# Patient Record
(demographics unavailable — no encounter records)

---

## 2025-01-02 NOTE — IMAGING
[AP] : anteroposterior [Mild arthritis (Tonnis Grade 1)] : Mild arthritis (Tonnis Grade 1) [Right] : right knee [All Views] : anteroposterior, lateral, skyline, and anteroposterior standing [There are no fractures, subluxations or dislocations. No significant abnormalities are seen] : There are no fractures, subluxations or dislocations. No significant abnormalities are seen [Components well fixed, in good position] : Components well fixed, in good position

## 2025-01-02 NOTE — HISTORY OF PRESENT ILLNESS
[5] : 5 [1] : 2 [Dull/Aching] : dull/aching [Radiating] : radiating [] : yes [Constant] : constant [Rest] : rest [Meds] : meds [Heat] : heat [Physical therapy] : physical therapy [Walking] : walking [de-identified] : 1/2/2025: Pt here to f/u R hip pain. States symptoms returned approx 11/1/24. Pain mainly in groin and radiates down to R knee and front of thigh. Finished PT for R hip approx 6/2024. States she also didnt receive injection in hip bc of apprehension. Pain associated with weakness and causing buckling of R leg. Slipped on ice and fell 12/26/24 as well.  PMH: R knee Baker's cyst, Partial knee replacement B knees at [facility] 2017, and 2021 8/31/23: Here to f/up back and right hip. Reports right hip pain and pain radiating throughout the right thigh over the anterior and medial aspect.  Attending PT which she does find to be helpful.  5/25/23: here to f/up rigth hip and review MRI results. 5/18/23: Here to f/up on lower back. C/O worsening right groin pain  02/23/23: Here to f/up back and review MRI results. Has consulted with Dr. Romero and is scheduled for L4-5 TFESI next month.  02/16/23: Here to f/up neck, reports improvement with neck pain after MDP at last visit. Reports persistent lower back pain and pain over the left groin radiating over the inner thigh to the knee. Hx of low back pain treated in 2019 with Dr. Marie, MRI with [facility] and did obtain relief after toradol injection.  08/23/22: Pt. is a 56 year old female who present for evaluation of her LT arm. Denies trauma. Symptoms since summer 2022. Reports numbness/tingling in left upper extremity. No formal treatment to date. H/O similar episode years ago. No pain with overhead activity/reaching. Pain does not wake her up at night. Has tried home exercises / stretching.   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  Pt states pain level is the same since last visit. No Improvement . Pt compliant with physical therapy feels like it helps going twice a week.  [FreeTextEntry7] : R foot [de-identified] : PT 2x per week

## 2025-01-02 NOTE — DISCUSSION/SUMMARY
[de-identified] : 58f with right hip djd. Also with hx of partial right knee replacement and complaints of medial right knee pain, would suggest she follow up with her surgeon at [facility].   The patient was advised of the diagnosis. The natural history of the pathology was explained in full to the patient in layman's terms. All questions were answered. The risks and benefits of surgical and non-surgical treatment alternatives were explained in full to the patient.   1) Naproxen rx - gi precautions reviewed  2) note for work: no more than 50miles in a car per day 3) cryotherapy, rest and activity modification  4) discussed the availability of csi for the right hip  5) start physical therapy   Entered by Arianne Keller acting as scribe. Dr. Hollingsworth- The documentation recorded by the scribe accurately reflects the service I personally performed and the decisions made by me.

## 2025-06-23 NOTE — DISCUSSION/SUMMARY
[de-identified] : 58f with right hip djd. Also with hx of partial right knee replacement and complaints of medial right knee pain.  The patient was advised of the diagnosis. The natural history of the pathology was explained in full to the patient in layman's terms. All questions were answered. The risks and benefits of surgical and non-surgical treatment alternatives were explained in full to the patient.   1) c/w PT and HEP  2) note for work: no more than 50miles in a car per day 3) cryotherapy, rest and activity modification  4) consult with joint replacement specialist for further evaluation  5) would anticipate missing occasional 1-2 days of work d/t flare ups of pain.  6) c/w previously prescribed naproxen 500mg, prn  7) rtc prn   Entered by Arianne Keller acting as scribe. Dr. Hollingsworth- The documentation recorded by the scribe accurately reflects the service I personally performed and the decisions made by me.

## 2025-06-23 NOTE — PHYSICAL EXAM
[4___] : flexion 4[unfilled]/5 [Right] : right knee [NL (0)] : extension 0 degrees [] : no calf tenderness [FreeTextEntry9] : contralateral flexion to 125 [TWNoteComboBox7] : flexion 115 degrees

## 2025-06-23 NOTE — HISTORY OF PRESENT ILLNESS
[5] : 5 [1] : 2 [Dull/Aching] : dull/aching [Radiating] : radiating [] : yes [Constant] : constant [Rest] : rest [Meds] : meds [Heat] : heat [Physical therapy] : physical therapy [Walking] : walking [de-identified] : 06/23/2025: Here to f/up for right knee pain. Started PT at [facility] in [place] 2x/week. Pt reports no change in pain. Pt c/o pain with stairs, walking for long periods. Takes Naproxen as prescribed.   1/2/2025: Pt here to f/u R hip pain. States symptoms returned approx 11/1/24. Pain mainly in groin and radiates down to R knee and front of thigh. Finished PT for R hip approx 6/2024. States she also didnt receive injection in hip bc of apprehension. Pain associated with weakness and causing buckling of R leg. Slipped on ice and fell 12/26/24 as well.  PMH: R knee Baker's cyst, Partial knee replacement B knees at [facility] 2017, and 2021 8/31/23: Here to f/up back and right hip. Reports right hip pain and pain radiating throughout the right thigh over the anterior and medial aspect.  Attending PT which she does find to be helpful.  5/25/23: here to f/up rigth hip and review MRI results. 5/18/23: Here to f/up on lower back. C/O worsening right groin pain  02/23/23: Here to f/up back and review MRI results. Has consulted with Dr. Romero and is scheduled for L4-5 TFESI next month.  02/16/23: Here to f/up neck, reports improvement with neck pain after MDP at last visit. Reports persistent lower back pain and pain over the left groin radiating over the inner thigh to the knee. Hx of low back pain treated in 2019 with Dr. Marie, MRI with [facility] and did obtain relief after toradol injection.  08/23/22: Pt. is a 56 year old female who present for evaluation of her LT arm. Denies trauma. Symptoms since summer 2022. Reports numbness/tingling in left upper extremity. No formal treatment to date. H/O similar episode years ago. No pain with overhead activity/reaching. Pain does not wake her up at night. Has tried home exercises / stretching.   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  Pt states pain level is the same since last visit. No Improvement . Pt compliant with physical therapy feels like it helps going twice a week.  [FreeTextEntry7] : R foot [de-identified] : PT 2x per week

## 2025-07-21 NOTE — DISCUSSION/SUMMARY
[de-identified] : 59 year female old with ____. We discussed at length the nature of the patient's condition. We discussed all options and conservative measures, such as ice, NSAIDs, physical therapy, exercise limitations, and injections.        Follow up in     The patient understood and verbally agreed to the treatment plan. All of their questions were answered. The patient should call the office if they have any questions or experience worsening symptoms.

## 2025-07-21 NOTE — PHYSICAL EXAM
[de-identified] : Constitutional: Well nourished , well developed and in no acute distress Psychiatric: Alert and oriented to time place and person.Appropriate affect . Skin: Head, neck, arms and lower extremities:no lesions or discoloration HEENT: Normocephalic, EOM intact, Nasal septum midline, Respiratory: Unlabored respirations,no audible wheezing ,no tachypnea, no cyanosis Cardiovascular: No leg swelling no ankle edema no JVD, pulse regular Vascular: No calf or thigh tenderness, Peripheral pulses: intact Lymphatics: No groin adenopathy,no lymphedema lower or upper extremities     o Right Hip Exam:   Inspection/Palpation : no tenderness, no swelling, no deformity Leg Lengths: equal Passive Range of Motion: flexion   degrees, internal    degrees, external    degrees, abduction    degrees, adduction    degrees Strength: resisted hip flexion 5/5, resisted hip abduction 5/5 Motor Strength: Peroneals, EHL, and tibialis anterior 5/5 Reflexes: Patella 2+ R=L, Achilles 2+ R=L Skin : no erythema, no ecchymosis Sensation : sensation to light touch intact Vascular Exam : no edema, no cyanosis, dorsalis pedis artery pulse _+, posterior tibial artery pulse _+       o Left Hip Exam:   Inspection/Palpation : no tenderness, no swelling, no deformity Leg Lengths: equal Passive Range of Motion: flexion   degrees, internal    degrees, external    degrees, abduction    degrees, adduction    degrees Strength: resisted hip flexion 5/5, resisted hip abduction 5/5 Skin : no erythema, no ecchymosis Sensation : sensation to light touch intact Vascular Exam : no edema, no cyanosis, dorsalis pedis artery pulse _+, posterior tibial artery pulse _+     o Right Knee Exam:   Inspection/Palpation : no tenderness to palpation, no swelling, no deformity Range of Motion : 0 - 120 degrees, no crepitus Stability : no valgus or varus instability present on provocative testing, Lachmans Test (-) Motor Strength: Peroneals, EHL, and tibialis anterior 5/5 Reflexes: Patella 2+ R=L, Achilles 2+ R=L Sensation : sensation to pin intact Vascular Exam : no edema, no cyanosis, dorsalis pedis artery pulse _+, posterior tibial artery pulse _+ Skin : no erythema, no ecchymosis    o Left Knee Exam:   Inspection/Palpation : no tenderness to palpation, no swelling, no deformity Range of Motion : 0 - 120 degrees, no crepitus Stability : no valgus or varus instability present on provocative testing, Lachmans Test (-) Motor Strength: Peroneals, EHL, and tibialis anterior 5/5 Reflexes: Patella 2+ R=L, Achilles 2+ R=L Sensation : sensation to pin intact Vascular Exam : no edema, no cyanosis, dorsalis pedis artery pulse _+, posterior tibial artery pulse _+ Skin : no erythema, no ecchymosis   [de-identified] : 4 views of the ________ knee obtained 07/16/2025  demonstrates lateral compartment degenerative narrowing with a bone on bone opposition, marginal osteophyte formation  AP Pelvis and AP/Lateral views of the ____ hip demonstrate

## 2025-07-21 NOTE — DISCUSSION/SUMMARY
[de-identified] : 59 year female old with ____. We discussed at length the nature of the patient's condition. We discussed all options and conservative measures, such as ice, NSAIDs, physical therapy, exercise limitations, and injections.        Follow up in     The patient understood and verbally agreed to the treatment plan. All of their questions were answered. The patient should call the office if they have any questions or experience worsening symptoms.

## 2025-07-21 NOTE — PHYSICAL EXAM
[de-identified] : Constitutional: Well nourished , well developed and in no acute distress Psychiatric: Alert and oriented to time place and person.Appropriate affect . Skin: Head, neck, arms and lower extremities:no lesions or discoloration HEENT: Normocephalic, EOM intact, Nasal septum midline, Respiratory: Unlabored respirations,no audible wheezing ,no tachypnea, no cyanosis Cardiovascular: No leg swelling no ankle edema no JVD, pulse regular Vascular: No calf or thigh tenderness, Peripheral pulses: intact Lymphatics: No groin adenopathy,no lymphedema lower or upper extremities     o Right Hip Exam:   Inspection/Palpation : no tenderness, no swelling, no deformity Leg Lengths: equal Passive Range of Motion: flexion   degrees, internal    degrees, external    degrees, abduction    degrees, adduction    degrees Strength: resisted hip flexion 5/5, resisted hip abduction 5/5 Motor Strength: Peroneals, EHL, and tibialis anterior 5/5 Reflexes: Patella 2+ R=L, Achilles 2+ R=L Skin : no erythema, no ecchymosis Sensation : sensation to light touch intact Vascular Exam : no edema, no cyanosis, dorsalis pedis artery pulse _+, posterior tibial artery pulse _+       o Left Hip Exam:   Inspection/Palpation : no tenderness, no swelling, no deformity Leg Lengths: equal Passive Range of Motion: flexion   degrees, internal    degrees, external    degrees, abduction    degrees, adduction    degrees Strength: resisted hip flexion 5/5, resisted hip abduction 5/5 Skin : no erythema, no ecchymosis Sensation : sensation to light touch intact Vascular Exam : no edema, no cyanosis, dorsalis pedis artery pulse _+, posterior tibial artery pulse _+     o Right Knee Exam:   Inspection/Palpation : no tenderness to palpation, no swelling, no deformity Range of Motion : 0 - 120 degrees, no crepitus Stability : no valgus or varus instability present on provocative testing, Lachmans Test (-) Motor Strength: Peroneals, EHL, and tibialis anterior 5/5 Reflexes: Patella 2+ R=L, Achilles 2+ R=L Sensation : sensation to pin intact Vascular Exam : no edema, no cyanosis, dorsalis pedis artery pulse _+, posterior tibial artery pulse _+ Skin : no erythema, no ecchymosis    o Left Knee Exam:   Inspection/Palpation : no tenderness to palpation, no swelling, no deformity Range of Motion : 0 - 120 degrees, no crepitus Stability : no valgus or varus instability present on provocative testing, Lachmans Test (-) Motor Strength: Peroneals, EHL, and tibialis anterior 5/5 Reflexes: Patella 2+ R=L, Achilles 2+ R=L Sensation : sensation to pin intact Vascular Exam : no edema, no cyanosis, dorsalis pedis artery pulse _+, posterior tibial artery pulse _+ Skin : no erythema, no ecchymosis   [de-identified] : 4 views of the ________ knee obtained 07/16/2025  demonstrates lateral compartment degenerative narrowing with a bone on bone opposition, marginal osteophyte formation  AP Pelvis and AP/Lateral views of the ____ hip demonstrate

## 2025-07-21 NOTE — HISTORY OF PRESENT ILLNESS
[de-identified] : BARBARA MAX is a 59-year-old female presenting to the office complaining of [] hip and knee pain. Patient presents ambulating independently. Patient reports pain for []. Denies any trauma or injury.  Denies any numbness or tingling of the lower extremities.

## 2025-07-21 NOTE — HISTORY OF PRESENT ILLNESS
[de-identified] : BARBARA MAX is a 59-year-old female presenting to the office complaining of [] hip and knee pain. Patient presents ambulating independently. Patient reports pain for []. Denies any trauma or injury.  Denies any numbness or tingling of the lower extremities.

## 2025-07-21 NOTE — ADDENDUM
[FreeTextEntry1] : This note was written by Jovita Bella on 07/16/2025 acting solely as a scribe for Dr. Donnie Mike.  All medical record entries made by the Scribe were at my, Dr. Donnie Mike, direction and personally dictated by me on 07/16/2025. I have personally reviewed the chart and agree that the record accurately reflects my personal performance of the history, physical exam, assessment and plan.

## 2025-07-22 NOTE — IMAGING
[de-identified] : Constitutional: well developed and well nourished, able to communicate Cardiovascular: Peripheral vascular exam is grossly normal Neurologic: Alert and oriented, no acute distress. Skin: normal skin with no ulcers, rashes, or lesions Pulmonary: No respiratory distress, breathing comfortably on room air Lymphatics: No obvious lymphadenopathy or lymphedema in areas examined  LEFT KNEE EXAM Alignment: Neutral  Effusion: None Atrophy: None                                                  Stable to Varus/valgus stress Posterior Drawer Test: negative Anterior Drawer Test: Negative Knee Extension/Flexion: 0 / 120  Medial/lateral compartments Medial joint line: No Tenderness Lateral joint line: POS Tenderness Fanny test: negative  Patellofemoral joint Medial patellar facet: no tenderness Patellar grind: Negative  Tendons: Pes Anserine: No tenderness Gerdys Tubercle/ IT Band: No tenderness Quadriceps Tendon: No Tenderness patellar tendon: no Tenderness Tibial tubercle: not tenderness Calf: no Tenderness  Neurovascular exam Muscle strength: 5/5 Sensation to light touch: intact Distal pulses: 2+  RIGHT KNEE EXAM Alignment: Neutral  Effusion: None Atrophy: None                                                  Stable to Varus/valgus stress Posterior Drawer Test: negative Anterior Drawer Test: Negative Knee Extension/Flexion: 0 / 120  Medial/lateral compartments Medial joint line: POS Tenderness Lateral joint line: POS Tenderness Fanny test: negative  Patellofemoral joint Medial patellar facet: no tenderness Patellar grind: Negative  Tendons: Pes Anserine: No tenderness Gerdys Tubercle/ IT Band: No tenderness Quadriceps Tendon: No Tenderness patellar tendon: no Tenderness Tibial tubercle: not tenderness Calf: no Tenderness  Neurovascular exam Muscle strength: 5/5 Sensation to light touch: intact Distal pulses: 2+  [id number] Xrays of the Right Knee were taken demonstrating  LUMBAR EXAM Alignment: normal Scoliosis: none Spinous process: no tenderness Thoracic paraspinal tenderness: no tenderness Lumbar Paraspinal tenderness: POS  tenderness  RANGE OF MOTION full without pain  MOTOR EXAM Hip Flexion: 5 /5 Knee Extension: 5 /5 Knee Flexion: 5 /5 Ankle Dorsiflexion: 5 /5 Ankle plantar flexion: 5 /5 Toe Extension: 5 /5  Straight leg sign: negative Sensation intact L2-S1 nerve root distribution Toes warm and well perfused  +impingment right hip  [id number] Xrays of the lumbar spine and pelvis were taken demonstrating hips no signs of fractures, dislocations,or significant arthritis. Right hip tonnis grade 3 OA. Left hip normal lumbar diffuse DDD Right knee mild lateral and PF OA and partial knee replacement left knee mild  lateral and PF OA with partial  knee replacement

## 2025-07-22 NOTE — HISTORY OF PRESENT ILLNESS
[de-identified] : 7/22/25 59F presenting with B/L hip and knee pain since 6/2025 when she missed a step walking downstairs and fell. went to [facility] ER and had L knee and L hip xrays. pain is associated with achiness, radiation to left foot. has tried biofreeze, hemp, naprosyn, and flexeril. H/O B/L partial knee replacement at [facility], Left knee 2019, R knee 2021 partial knee replacement. Pain most with walking and bearing weight.  + naproxen

## 2025-07-22 NOTE — ASSESSMENT
[FreeTextEntry1] : 59 year F with right hip OA, lumbar DDD, Bilateral partial knee with OA in lateral and PF joint Naproxen from prior MD PT provided consideration CSI of the right hip in the future can consider CSI of PKA however will try to minimize  - Return in 6 weeks for follow up